# Patient Record
Sex: MALE | Race: WHITE | NOT HISPANIC OR LATINO | Employment: STUDENT | URBAN - NONMETROPOLITAN AREA
[De-identification: names, ages, dates, MRNs, and addresses within clinical notes are randomized per-mention and may not be internally consistent; named-entity substitution may affect disease eponyms.]

---

## 2018-09-12 ENCOUNTER — OFFICE VISIT (OUTPATIENT)
Dept: FAMILY MEDICINE | Facility: OTHER | Age: 18
End: 2018-09-12
Attending: PHYSICIAN ASSISTANT
Payer: COMMERCIAL

## 2018-09-12 VITALS
HEART RATE: 72 BPM | HEIGHT: 75 IN | DIASTOLIC BLOOD PRESSURE: 80 MMHG | WEIGHT: 189.4 LBS | SYSTOLIC BLOOD PRESSURE: 122 MMHG | BODY MASS INDEX: 23.55 KG/M2

## 2018-09-12 DIAGNOSIS — Z02.5 SPORTS PHYSICAL: Primary | ICD-10-CM

## 2018-09-12 PROCEDURE — 99395 PREV VISIT EST AGE 18-39: CPT | Performed by: PHYSICIAN ASSISTANT

## 2018-09-12 NOTE — NURSING NOTE
Patient presents to clinic for physical.  Rachel Hagan LPN ...... 9/12/2018 9:35 AM    Chief Complaint   Patient presents with     Physical       Initial /80 (BP Location: Right arm, Patient Position: Sitting, Cuff Size: Adult Regular)  Pulse 72  Wt 189 lb 6.4 oz (85.9 kg) There is no height or weight on file to calculate BMI.  Medication Reconciliation: complete    Serenity Hagan LPN

## 2018-09-12 NOTE — MR AVS SNAPSHOT
"              After Visit Summary   2018    Jonnathan Ogden    MRN: [id number]           Patient Information     Date Of Birth          2000        Visit Information        Provider Department      2018 9:30 AM Genesis Mac PA-C [facility] [place] [facility]        Today's Diagnoses     Sports physical    -  1       Follow-ups after your visit        Follow-up notes from your care team     Return in about 1 year (around 2019) for Physical Exam.      Who to contact     If you have questions or need follow up information about today's clinic visit or your schedule please contact [facility] AND [facility] directly at [phone].  Normal or non-critical lab and imaging results will be communicated to you by Flitehart, letter or phone within 4 business days after the clinic has received the results. If you do not hear from us within 7 days, please contact the clinic through Flitehart or phone. If you have a critical or abnormal lab result, we will notify you by phone as soon as possible.  Submit refill requests through Ideal Power or call your pharmacy and they will forward the refill request to us. Please allow 3 business days for your refill to be completed.          Additional Information About Your Visit        MyChart Information     Ideal Power lets you send messages to your doctor, view your test results, renew your prescriptions, schedule appointments and more. To sign up, go to [email or web address] . Click on \"Log in\" on the left side of the screen, which will take you to the Welcome page. Then click on \"Sign up Now\" on the right side of the page.     You will be asked to enter the access code listed below, as well as some personal information. Please follow the directions to create your username and password.     Your access code is: [email or web address]  Expires: 2018  4:38 PM     Your access code will  in 90 days. If you need help or a new code, please call your [facility] " "clinic or [phone].        Care EveryWhere ID     This is your Care EveryWhere ID. This could be used by other organizations to access your [facility] medical records  [id number]        Your Vitals Were     Pulse Height BMI (Body Mass Index)             72 6' 3\" (1.905 m) 23.67 kg/m2          Blood Pressure from Last 3 Encounters:   09/12/18 122/80    Weight from Last 3 Encounters:   09/12/18 189 lb 6.4 oz (85.9 kg)              Today, you had the following     No orders found for display       Primary Care Provider Fax #    Physician No Ref-Primary [phone]       No address on file        Equal Access to Services     [facility]: Hadii patrick Kelley, wasangitada halinaadaha, qaybviji kaalmada kameron, cindy purcell . So [facility] [phone].    ATENCIÓN: Si habla español, tiene a jones disposición servicios gratuitos de asistencia lingüística. Llame al [phone].    We comply with applicable federal civil rights laws and [place] laws. We do not discriminate on the basis of race, color, national origin, age, disability, sex, sexual orientation, or gender identity.            Thank you!     Thank you for choosing [facility] AND hospitals  for your care. Our goal is always to provide you with excellent care. Hearing back from our patients is one way we can continue to improve our services. Please take a few minutes to complete the written survey that you may receive in the mail after your visit with us. Thank you!             Your Updated Medication List - Protect others around you: Learn how to safely use, store and throw away your medicines at www.disposemymeds.org.      Notice  As of 9/12/2018  4:38 PM    You have not been prescribed any medications.      "

## 2019-07-10 ENCOUNTER — OFFICE VISIT (OUTPATIENT)
Dept: FAMILY MEDICINE | Facility: OTHER | Age: 19
End: 2019-07-10
Attending: NURSE PRACTITIONER
Payer: COMMERCIAL

## 2019-07-10 VITALS
OXYGEN SATURATION: 98 % | DIASTOLIC BLOOD PRESSURE: 80 MMHG | WEIGHT: 203.8 LBS | SYSTOLIC BLOOD PRESSURE: 130 MMHG | HEART RATE: 72 BPM | TEMPERATURE: 98.4 F | RESPIRATION RATE: 18 BRPM | HEIGHT: 75 IN | BODY MASS INDEX: 25.34 KG/M2

## 2019-07-10 DIAGNOSIS — S61.210A LACERATION OF RIGHT INDEX FINGER WITHOUT FOREIGN BODY, NAIL DAMAGE STATUS UNSPECIFIED, INITIAL ENCOUNTER: Primary | ICD-10-CM

## 2019-07-10 PROCEDURE — 90471 IMMUNIZATION ADMIN: CPT | Performed by: NURSE PRACTITIONER

## 2019-07-10 PROCEDURE — 90715 TDAP VACCINE 7 YRS/> IM: CPT | Performed by: NURSE PRACTITIONER

## 2019-07-10 PROCEDURE — 99213 OFFICE O/P EST LOW 20 MIN: CPT | Mod: 25 | Performed by: NURSE PRACTITIONER

## 2019-07-10 ASSESSMENT — MIFFLIN-ST. JEOR: SCORE: 2020.06

## 2019-07-10 ASSESSMENT — PAIN SCALES - GENERAL: PAINLEVEL: MILD PAIN (2)

## 2019-07-10 NOTE — NURSING NOTE
"Chief Complaint   Patient presents with     Laceration     finger     DOI 7/10/19. Hitching a trailer.    Initial /80   Pulse 72   Temp 98.4  F (36.9  C) (Temporal)   Resp 18   Ht 1.905 m (6' 3\")   Wt 92.4 kg (203 lb 12.8 oz)   SpO2 98%   BMI 25.47 kg/m   Estimated body mass index is 25.47 kg/m  as calculated from the following:    Height as of this encounter: 1.905 m (6' 3\").    Weight as of this encounter: 92.4 kg (203 lb 12.8 oz).    Medication Reconciliation: complete      Norma J. Gosselin, LPN  "

## 2019-07-10 NOTE — PROGRESS NOTES
"Subjective     Jonnathan Ogden is a 18 year old male who presents to clinic today for the following health issues:    HPI   Finger laceration  Was at work this am, attempting to attach a boat trailer to a ball hitch and coworker put the vehicle in park when his finger was narrowly underneath the hitch, pinching his finger between the two. Has only mild pain, is able to move finger.  Presents here for examination.     Patient Active Problem List   Diagnosis     AR (allergic rhinitis)     Exercise-induced bronchospasm     Past Surgical History:   Procedure Laterality Date     NO HISTORY OF SURGERY         Social History     Tobacco Use     Smoking status: Never Smoker     Smokeless tobacco: Never Used   Substance Use Topics     Alcohol use: Yes     Alcohol/week: 1.2 oz     Types: 2 Cans of beer per week     Family History   Problem Relation Age of Onset     No Known Problems Mother      No Known Problems Father          No current outpatient medications on file.     Allergies   Allergen Reactions     Grass        Reviewed and updated as needed this visit by Provider  Tobacco  Allergies  Meds  Problems  Med Hx  Surg Hx  Fam Hx  Soc Hx          Review of Systems   ROS COMP: As above      Objective    /80   Pulse 72   Temp 98.4  F (36.9  C) (Temporal)   Resp 18   Ht 1.905 m (6' 3\")   Wt 92.4 kg (203 lb 12.8 oz)   SpO2 98%   BMI 25.47 kg/m    Body mass index is 25.47 kg/m .  Physical Exam   GENERAL: healthy, alert and no distress  MS: normal ROM of R second digit, CMS intact; approx 2.5cm curved laceration without flap to the distal finger pad opening to the lateral edge of fingertip, approx 1cm straight laceration without flap to the medial and distal aspect of fingertip    Diagnostic Test Results:  Labs reviewed in Epic  none         Assessment & Plan     1. Laceration of right index finger without foreign body, nail damage status unspecified, initial encounter  Pt reports having pinched his finger " between trailer hitch and trailer this am at work, did not crush finger. Discussed options for care of site including sutures or use of steristrips to help approximate the wound edges, xrays to ensure no crushing injury occurred or foreign body presence. At this time was decided to use steri strips to help approximate the small gap in tissues, no xrays today as CMS is intact, ROM of finger is normal without increased pain and there is no deformity. Wound was soaked in normal saline and surgical scrub in office, no obvious foreign material noted but hand and fingers are dirty. Discussed signs of infection, need to return to clinic. Tdap updated today in office as it had been nearly 10 years since previous.       Hallie Gastelum NP  [facility] AND \[facility] [place] [facility]\""

## 2019-07-11 ASSESSMENT — ASTHMA QUESTIONNAIRES: ACT_TOTALSCORE: 25

## 2020-01-14 ENCOUNTER — OFFICE VISIT (OUTPATIENT)
Dept: FAMILY MEDICINE | Facility: OTHER | Age: 20
End: 2020-01-14

## 2020-01-14 VITALS
DIASTOLIC BLOOD PRESSURE: 63 MMHG | RESPIRATION RATE: 16 BRPM | BODY MASS INDEX: 25.78 KG/M2 | WEIGHT: 190.3 LBS | TEMPERATURE: 98.3 F | HEART RATE: 62 BPM | SYSTOLIC BLOOD PRESSURE: 103 MMHG | OXYGEN SATURATION: 99 % | HEIGHT: 72 IN

## 2020-01-14 DIAGNOSIS — L01.00 IMPETIGO: Primary | ICD-10-CM

## 2020-01-14 DIAGNOSIS — L98.9 SKIN LESION: ICD-10-CM

## 2020-01-14 PROCEDURE — 99213 OFFICE O/P EST LOW 20 MIN: CPT | Performed by: PHYSICIAN ASSISTANT

## 2020-01-14 RX ORDER — MUPIROCIN 20 MG/G
OINTMENT TOPICAL 3 TIMES DAILY
Qty: 15 G | Refills: 0 | Status: SHIPPED | OUTPATIENT
Start: 2020-01-14 | End: 2020-01-21

## 2020-01-14 RX ORDER — CLOTRIMAZOLE 1 %
CREAM (GRAM) TOPICAL 2 TIMES DAILY
Qty: 24 G | Refills: 0 | Status: SHIPPED | OUTPATIENT
Start: 2020-01-14

## 2020-01-14 ASSESSMENT — MIFFLIN-ST. JEOR: SCORE: 1910.07

## 2020-01-14 ASSESSMENT — PAIN SCALES - GENERAL: PAINLEVEL: NO PAIN (0)

## 2020-01-14 NOTE — NURSING NOTE
"Chief Complaint   Patient presents with     Derm Problem     lesion right forearm       Initial /63   Pulse 62   Temp 98.3  F (36.8  C)   Resp 16   Ht 1.835 m (6' 0.24\")   Wt 86.3 kg (190 lb 4.8 oz)   SpO2 99%   BMI 25.64 kg/m   Estimated body mass index is 25.64 kg/m  as calculated from the following:    Height as of this encounter: 1.835 m (6' 0.24\").    Weight as of this encounter: 86.3 kg (190 lb 4.8 oz).  Medication Reconciliation: complete    Marjorie Torres LPN  "

## 2020-01-14 NOTE — PROGRESS NOTES
"HPI:    Jonnathan Ogden is a 19 year old male who presents to clinic today for evaluation of skin lesion on his right volar wrist/forearm  Onset yesterday, course is unchanged  Patient is in wrestling and he feels it is likely impetigo as he has had it in the past  No other lesions  He is feeling well with no fever    Past Medical History:   Diagnosis Date     Medical history non-contributory        No current outpatient medications on file.       Allergies   Allergen Reactions     Grass        ROS:  ROS:  General: feels well, no fever  Skin: POSITIVE for skin lesion per HPI  HENT: negative  Respiratory: negative  Abdomen: negative  Musculoskleletal: negative  Neurological: negative    EXAM:  Vitals:    01/14/20 1055   BP: 103/63   Pulse: 62   Resp: 16   Temp: 98.3  F (36.8  C)   SpO2: 99%   Weight: 86.3 kg (190 lb 4.8 oz)   Height: 1.835 m (6' 0.24\")     General appearance: well appearing male, in no acute distress  Dermatological: 1 cm erythematous lesion on volar right forearm. Is slightly raised/crusty in appearance. No drainage  Psychological: normal affect, alert and pleasant    ASSESSMENT AND PLAN:    1. Impetigo    2. Skin lesion      Rash on right forearm  Will treat for for suspected impetigo/ring worm  Wash with warm soapy water daily. Do not share towels or common items  Avoid picking and itching  Apply topical antibiotic ointment to affected areas 3 times daily for 5-7 days  Apply topical antifungal twice daily  Good hand washing  Trim nails, do not pick at it. Cover with a bandage   Follow up with PCP if symptoms persist or worsen  Patient received verbal and written instruction including review of warning signs    Denae Palomo PA-C on 1/14/2020 at 11:51 AM        "

## 2020-01-14 NOTE — PATIENT INSTRUCTIONS
Rash on right wrist  Will treat for suspected impetigo/ring worm  Wash with warm soapy water daily. Do not share towels or common items  Avoid picking and itching  Apply topical antibiotic ointment to affected areas 3 times daily for 5-7 days  Apply topical antifungal twice daily  Good hand washing  Follow up with PCP if symptoms persist or worsen  Seek immediate care for    Fever of 100.4 F (38 C) or higher, or as directed    Increasing number of sores or spreading areas of redness after 2 days of treatment with antibiotics    Increasing swelling or pain    Increased amounts of fluid or pus coming from the sores    Unusual drowsiness, weakness, or change in behavior    Loss of appetite or vomiting    Patient Education     Understanding Impetigo  Impetigo is a common bacterial infection of the skin. It most often affects the face, arms, and legs. But it can appear on any part of the body. Anyone can have it, regardless of age. But it is most common in children. Impetigo is very contagious. This means it spreads easily to other people.  How to say it  [id number]   What causes impetigo?  Many types of bacteria live on normal, healthy skin. The bacteria usually don t cause problems. Impetigo happens when bacteria enter the skin through a scratch, break, sore, bite, or irritated spot. They then begin to grow out of control, leading to infection. There are two types of staphylococcus bacteria that cause impetigo. In certain cases, impetigo appears on skin that has no visible break. It may be more likely to occur on skin that has another skin problem, such as eczema. It may also be more common after a cold or other virus.  Symptoms of impetigo  Symptoms of this problem include:    Small, fluid-filled blisters on the skin that may itch, ooze, or crust    A yellow, honey-colored crust on the infected skin    Skin sores that spread with scratching    An itchy rash that spreads with scratching    Swollen lymph  nodes  Treatment for impetigo  The goal is to treat the infection and prevent it from spreading to others.    You will likely be given an antibiotic to treat the infection. This may be a cream or ointment called muporicin to put on your skin. If the infection is severe or spreading, you may be given antibiotic medicine to take by mouth. Be sure to use this medicine as directed. Do not stop using it until you are told to stop, even if your skin gets better. If you stop too soon, the infection may come back and be harder to treat.    Avoid scratching or picking at your sores. It may help to cover affected areas with a bandage.    To prevent spreading the infection, wash your hands often. Avoid sharing personal items, towels, clothes, pillows, and sheets with others. After each use, wash these items in hot water.    Clean the affected skin several times a day. Don t scrub. Instead, soak the area in warm, soapy water. This will help remove the crust that forms. For places that you can't soak, such as the face, place a clean, warm (not hot) washcloth on the affected area. Use a new washcloth and towel each time.  When to call your healthcare provider  Call your healthcare provider right away if you have any of these:    Fever of 100.4 F (38 C) or higher, or as directed    Increasing number of sores or spreading areas of redness after 2 days of treatment with antibiotics    Increasing swelling or pain    Increased amounts of fluid or pus coming from the sores    Unusual drowsiness, weakness, or change in behavior    Loss of appetite or vomiting   Date Last Reviewed: 5/1/2016 2000-2019 [employer]. [address] [place] [address], [place] [address]. All rights reserved. This information is not intended as a substitute for professional medical care. Always follow your healthcare professional's instructions.           Patient Education     Ringworm of the Skin    Ringworm is a fungal infection of the skin. Despite  the name, a worm doesn't cause it. The cause of ringworm is a fungus that infects the outer layers of the skin. It is also not caused by bed bugs, scabies, or lice. These are totally different.  The medical term for ringworm is tinea. It can affect most parts of your body, although it seems to do better in moist areas of the body and around hair. It can be on almost any part of your body, including:    Arms, hands, legs, chest, feet, and back    Scalp    Beard    Groin    Between the toes  Depending on where it is located, sometimes the name changes:    Tinea capitis (scalp)    Tinea cruris (groin)    Tinea corporis (body)    Tinea pedis (feet)  Causes  Ringworm is very common all over the world, including the [place] It can take less than 1 week up to 2 weeks before you develop the infection after being exposed. So, you may not figure out the exact cause.  It is spread through direct contact with:    An infected person or animal    Infected soil, or objects such as towels, clothing, and jaquez  Symptoms  At first you might not notice ringworm. Or you may just see a small, red, often raised itchy spot or pimple. Sometimes there may only be one spot. At other times there may be several. Ringworm can look slightly different on different parts of the body, but there are some things are always present:    Irregular, round, oval or ring-shaped, which is why it's called ringworm    Clearer or lighter color at the center, since it spreads from the center of the spot outward    Red or inflamed look    Raised    Itchy    Scaly, dry, or flaky  Home care  Follow these tips to help care for yourself at home:    Leave it alone. Don't scratch at the rash or pick it. This can increase the chance of infection and scarring.    Take medicine as prescribed. If you were prescribed a cream, apply it exactly as directed. Make sure to put the cream not just on the rash, but also on the skin 1 or 2 inches around it. Medicine by mouth  is sometimes needed, particularly for ringworm on the scalp. Take it as directed and until your healthcare provider says to stop.    Keep it from spreading to others. Untreated ringworm of the skin is contagious by skin-to-skin contact. Your child may return to school 2 days after treatment has started.  Prevention  To some degree, prevention depends on what part of your body was affected. In general, the following good hygiene can help.    Clean up after you get dirty or sweaty, or after using a locker room.    When possible, don t share jaquez and brushes.    Avoid having your skin and feet wet or damp for long periods.    Wear clean, loose-fitting underwear.  Follow-up care  Follow up with your healthcare provider as advised by our staff if the rash does not improve after 10 days of treatment or if the rash spreads to other areas of the body.  When to seek medical advice  Call your healthcare provider right away if any of these occur:    Redness around the rash gets worse    Fluid drains from the rash    Fever of 100.4 F (38 C) or higher, or as directed by your healthcare provider  Date Last Reviewed: 8/1/2016 2000-2019 [employer]. [address] [place] [address], [place] [address]. All rights reserved. This information is not intended as a substitute for professional medical care. Always follow your healthcare professional's instructions.

## 2021-05-16 NOTE — PROGRESS NOTES
Patient is cleared for sports participation.  Provided nutrition, lifestyle, health and safety counseling.  Also discussed sport specific injury prevention and provided head injury education.   Please see [facility] form which is scanned in EMR.  Copy of release given to patient.     Patient's BMI is Body mass index is 23.67 kg/(m^2). Counseling about nutrition and physical activity were provided to patient and/or parent.    Genesis Mac PA-C..................9/12/2018 9:49 AM         I personally examined this patient and provided care in conjunction with the resident.